# Patient Record
Sex: MALE | Race: WHITE
[De-identification: names, ages, dates, MRNs, and addresses within clinical notes are randomized per-mention and may not be internally consistent; named-entity substitution may affect disease eponyms.]

---

## 2020-03-17 ENCOUNTER — HOSPITAL ENCOUNTER (OUTPATIENT)
Dept: HOSPITAL 35 - SJCVC | Age: 60
End: 2020-03-17
Attending: INTERNAL MEDICINE
Payer: COMMERCIAL

## 2020-03-17 DIAGNOSIS — E78.00: ICD-10-CM

## 2020-03-17 DIAGNOSIS — R53.83: ICD-10-CM

## 2020-03-17 DIAGNOSIS — N18.6: ICD-10-CM

## 2020-03-17 DIAGNOSIS — I42.9: ICD-10-CM

## 2020-03-17 DIAGNOSIS — I65.23: ICD-10-CM

## 2020-03-17 DIAGNOSIS — I25.10: ICD-10-CM

## 2020-03-17 DIAGNOSIS — Z79.899: ICD-10-CM

## 2020-03-17 DIAGNOSIS — I13.2: ICD-10-CM

## 2020-03-17 DIAGNOSIS — I50.43: ICD-10-CM

## 2020-03-17 DIAGNOSIS — Z01.818: Primary | ICD-10-CM

## 2020-03-17 DIAGNOSIS — R94.31: ICD-10-CM

## 2020-03-17 DIAGNOSIS — I44.30: ICD-10-CM

## 2020-03-17 DIAGNOSIS — I08.8: ICD-10-CM

## 2020-03-17 DIAGNOSIS — F17.200: ICD-10-CM

## 2020-03-17 DIAGNOSIS — Z99.2: ICD-10-CM

## 2020-03-17 DIAGNOSIS — I48.92: ICD-10-CM

## 2020-03-24 ENCOUNTER — HOSPITAL ENCOUNTER (INPATIENT)
Dept: HOSPITAL 35 - CATH | Age: 60
LOS: 1 days | Discharge: LEFT BEFORE DISCHARGE | DRG: 286 | End: 2020-03-25
Attending: INTERNAL MEDICINE | Admitting: INTERNAL MEDICINE
Payer: COMMERCIAL

## 2020-03-24 VITALS — DIASTOLIC BLOOD PRESSURE: 77 MMHG | SYSTOLIC BLOOD PRESSURE: 130 MMHG

## 2020-03-24 VITALS — SYSTOLIC BLOOD PRESSURE: 113 MMHG | DIASTOLIC BLOOD PRESSURE: 86 MMHG

## 2020-03-24 VITALS — SYSTOLIC BLOOD PRESSURE: 128 MMHG | DIASTOLIC BLOOD PRESSURE: 92 MMHG

## 2020-03-24 VITALS — SYSTOLIC BLOOD PRESSURE: 115 MMHG | DIASTOLIC BLOOD PRESSURE: 78 MMHG

## 2020-03-24 VITALS — DIASTOLIC BLOOD PRESSURE: 89 MMHG | SYSTOLIC BLOOD PRESSURE: 130 MMHG

## 2020-03-24 VITALS — HEIGHT: 70.98 IN | WEIGHT: 223.31 LBS | BODY MASS INDEX: 31.26 KG/M2

## 2020-03-24 VITALS — DIASTOLIC BLOOD PRESSURE: 96 MMHG | SYSTOLIC BLOOD PRESSURE: 133 MMHG

## 2020-03-24 VITALS — DIASTOLIC BLOOD PRESSURE: 94 MMHG | SYSTOLIC BLOOD PRESSURE: 135 MMHG

## 2020-03-24 VITALS — SYSTOLIC BLOOD PRESSURE: 138 MMHG | DIASTOLIC BLOOD PRESSURE: 94 MMHG

## 2020-03-24 VITALS — DIASTOLIC BLOOD PRESSURE: 92 MMHG | SYSTOLIC BLOOD PRESSURE: 135 MMHG

## 2020-03-24 VITALS — DIASTOLIC BLOOD PRESSURE: 81 MMHG | SYSTOLIC BLOOD PRESSURE: 122 MMHG

## 2020-03-24 VITALS — DIASTOLIC BLOOD PRESSURE: 97 MMHG | SYSTOLIC BLOOD PRESSURE: 129 MMHG

## 2020-03-24 VITALS — SYSTOLIC BLOOD PRESSURE: 122 MMHG | DIASTOLIC BLOOD PRESSURE: 81 MMHG

## 2020-03-24 DIAGNOSIS — N18.6: ICD-10-CM

## 2020-03-24 DIAGNOSIS — I12.0: ICD-10-CM

## 2020-03-24 DIAGNOSIS — I48.91: ICD-10-CM

## 2020-03-24 DIAGNOSIS — Z53.29: ICD-10-CM

## 2020-03-24 DIAGNOSIS — E87.70: ICD-10-CM

## 2020-03-24 DIAGNOSIS — I25.10: Primary | ICD-10-CM

## 2020-03-24 DIAGNOSIS — J44.9: ICD-10-CM

## 2020-03-24 DIAGNOSIS — I27.20: ICD-10-CM

## 2020-03-24 DIAGNOSIS — I42.8: ICD-10-CM

## 2020-03-24 DIAGNOSIS — Z79.82: ICD-10-CM

## 2020-03-24 DIAGNOSIS — Z79.899: ICD-10-CM

## 2020-03-24 DIAGNOSIS — M19.90: ICD-10-CM

## 2020-03-24 LAB
ANION GAP SERPL CALC-SCNC: 7 MMOL/L (ref 7–16)
BUN SERPL-MCNC: 46 MG/DL (ref 7–18)
CALCIUM SERPL-MCNC: 9.9 MG/DL (ref 8.5–10.1)
CHLORIDE SERPL-SCNC: 98 MMOL/L (ref 98–107)
CO2 SERPL-SCNC: 31 MMOL/L (ref 21–32)
CREAT SERPL-MCNC: 7.2 MG/DL (ref 0.7–1.3)
ERYTHROCYTE [DISTWIDTH] IN BLOOD BY AUTOMATED COUNT: 16.3 % (ref 10.5–14.5)
GLUCOSE SERPL-MCNC: 71 MG/DL (ref 74–106)
HCT VFR BLD CALC: 35.7 % (ref 42–52)
HGB BLD-MCNC: 11.8 GM/DL (ref 14–18)
MCH RBC QN AUTO: 33.3 PG (ref 26–34)
MCHC RBC AUTO-ENTMCNC: 32.9 G/DL (ref 28–37)
MCV RBC: 101.1 FL (ref 80–100)
PLATELET # BLD: 244 THOU/UL (ref 150–400)
POTASSIUM SERPL-SCNC: 4.7 MMOL/L (ref 3.5–5.1)
RBC # BLD AUTO: 3.53 MIL/UL (ref 4.5–6)
SODIUM SERPL-SCNC: 136 MMOL/L (ref 136–145)
WBC # BLD AUTO: 6.5 THOU/UL (ref 4–11)

## 2020-03-24 PROCEDURE — 4A023N8 MEASUREMENT OF CARDIAC SAMPLING AND PRESSURE, BILATERAL, PERCUTANEOUS APPROACH: ICD-10-PCS | Performed by: INTERNAL MEDICINE

## 2020-03-24 PROCEDURE — 32100 EXPLORATION OF CHEST: CPT

## 2020-03-24 PROCEDURE — B2111ZZ FLUOROSCOPY OF MULTIPLE CORONARY ARTERIES USING LOW OSMOLAR CONTRAST: ICD-10-PCS | Performed by: INTERNAL MEDICINE

## 2020-03-24 PROCEDURE — B2151ZZ FLUOROSCOPY OF LEFT HEART USING LOW OSMOLAR CONTRAST: ICD-10-PCS | Performed by: INTERNAL MEDICINE

## 2020-03-24 NOTE — HC
[facility]
[address]
[place]   [address]                     CONSULTATION                  
_______________________________________________________________________________
 
Name:       CYRUS RAGSDALE SOO                Room #:         213-P       REG CLI 
M.R.#:      [id number]                       Account #:      [id number]  
Admission:  03/24/20    Attend Phys:    Buddy Sanches MD,
Discharge:              Date of Birth:  [birth year]  
                                                          Report #: [id number]
                                                                    [id number]   
_______________________________________________________________________________
THIS REPORT FOR:  
 
cc:  Laura Mari Karen K. RNP Al-Absi, Ahmed I. MD                                          ~
CC: Buddy Mari
 
DATE OF SERVICE:  03/25/2020
 
 
REASON FOR THE CONSULTATION:  End-stage renal disease.
 
REASON FOR THE PRESENTATION:  Post-cardiac cath.
 
HISTORY OF PRESENT ILLNESS:  A 60-year-old with past medical history of
end-stage renal disease, maintained on hemodialysis.  He was in the cardiac cath
yesterday having a left- and right-sided heart catheterization for progressive
dyspnea and severe left ventricular dysfunction that was found on the echo. 
Apparently, the patient has been on dialysis for the last 18 months.  He
attributed his end-stage renal disease due to uncontrolled hypertension.  He
dialyzes every Monday, Wednesday, Friday.  He was found to have significant
pulmonary artery pressure.  He was admitted for further evaluation and
aggressive dialysis regimen.  I was asked to evaluate his needs for daily
dialysis needs to attain better volume control.
 
MEDICATIONS:
1.  Lisinopril.
2.  Flomax.
3.  Eliquis.
4.  Metoprolol.
 
PAST MEDICAL HISTORY:
1.  Severe hypertension, uncontrolled.
2.  Cardiomyopathy.
3.  COPD.
4.  Degenerative joint disease.
5.  Post-hernia repair.
6.  Remote history of heavy tobacco abuse.
 
SOCIAL HISTORY:  .  Lives with his father.
 
ALLERGIES:  None.
 
REVIEW OF SYSTEMS:
GENERAL:  No fever or chills.
 
 
 
[facility]
[address]
[place]   [address]                     CONSULTATION                  
_______________________________________________________________________________
 
Name:       CYRUS RAGSDALE                Room #:         213-P       REG CLI 
CORINA#:      [id number]                       Account #:      [id number]  
Admission:  03/24/20    Attend Phys:    Buddy Sanches MD,
Discharge:              Date of Birth:  [birth year]  
                                                          Report #: [id number]
                                                                    [id number]   
_______________________________________________________________________________
CARDIOVASCULAR:  No chest pain or palpitation.  Significant for dyspnea on
exertion.
PULMONARY:  Significant for dyspnea on exertion.
GASTROINTESTINAL:  No nausea or vomiting.
GENITOURINARY:  Minimal urine.  No frequency, no urgency.
MUSCULOSKELETAL:  Occasional back pain.
 
LABORATORY VALUES:  Reviewed.  Hemoglobin is 11.4.  Sodium is 136, BUN is 46,
creatinine 7.2.
 
ASSESSMENT, IMPRESSION, PLAN:
1.  End-stage renal disease.
2.  Cardiomyopathy.
3.  Hypertension.
4.  Severe pulmonary hypertension.
5.  We will work on daily dialysis and ultrafiltration.
6.  We will discuss with the cardiac team regarding his pulmonary hypertension.
7.  Resume his usual cardiomyopathy medications.
8.  We will continue to follow.
 
 
 
 
 
 
 
 
 
 
 
 
 
 
 
 
 
 
 
 
 
 
 
 
 
                         
   By:                               
                   
D: 03/25/20 0747                           _____________________________________
T: 03/25/20 0815                           Miriam Barone MD            /nt

## 2020-03-24 NOTE — NUR
PT ADMITED FROM CATH LAB. ADMISSION HX AND ASSESSMENT COMPLETED. VSS. RIGHT
GROIN INCISION C/D/I. NO HAMATOMA NOTED. POST CARDIAC CATH INSTRUCTIONS GIVEN
TO PT. PT VERBERLISED UNDERSTANDING. WILL CONTINUE TO MONITOR.

## 2020-03-24 NOTE — H
[facility]
[address]
[place]   [address]                     HISTORY AND PHYSICAL          
_______________________________________________________________________________
 
Name:       CYRUS RAGSDALE                Room #:                     REG [facility]#:      [id number]                       Account #:      [id number]  
Admission:  03/24/20    Attend Phys:    Buddy Sanches MD,
Discharge:              Date of Birth:  [birth year]  
                                                          Report #: [id number]
                                                                    [id number]   
_______________________________________________________________________________
THIS REPORT FOR:  
 
cc:  Laura Mari Karen K. RNP                                                   
                                                                  ~
CC: Buddy Mari
 
DATE OF SERVICE:  03/24/2020
 
 
HISTORY OF PRESENT ILLNESS:  The patient is a 60-year-old male who I had brought
in for right and left heart catheterization with some progressive dyspnea,
shortness of breath and severe LV dysfunction in the office last week on an
echo.  He has been on end-stage kidney disease with hemodialysis for the last 18
months or so, but became progressively more short of breath and dyspneic.  Right
and left heart catheterization was performed, which revealed severe pulmonary
hypertension and wedge pressure in the 30-32, PA pressure of 70s over 30s with
diminished cardiac output, cardiac index.  We placed him on admission for more
aggressive diuresis and titration of medications.  He is considering renal
transplant, although at this state, he would not be a candidate.  His coronary
disease was mild in 3-vessel, but this LV dysfunction is relatively new since he
was lost to follow up for me since 2015 until last week.  I was trying to
titrate up his beta blocker.  He did not tolerate at all.  So, he has held that
for 2 days.  He has been on Lasix.  He makes some urine.  Dialysis is Monday,
Wednesday and Friday.  Lisinopril 40, Flomax, Eliquis 2.5 q. 12 hours,
metoprolol 100, which he held, we try to increase him to 100, he did not
tolerate this.
 
PAST MEDICAL HISTORY:  Positive for this, now idiopathic cardiomyopathy with a
significant cardiorenal issue here with mild coronary disease, longstanding
hypertension, hernia repair, vein surgery, DJD, COPD, prior heavy tobacco and
alcohol.
 
SOCIAL HISTORY:  He is , has children.  He lives with his father.  He
has disability.  He is a prior heavy tobacco user, current little to a few
drinks a week and a few cigarettes a day now, he states.  Some caffeine.
 
ALLERGIES:  No known drug allergies.
 
FAMILY HISTORY:  Did not know his parents' medical history.
 
REVIEW OF SYSTEMS:  Negative except for stated above, progressive fatigue,
shortness of breath and dyspnea.  Marked increase in fatigue.  No syncope.
 
PHYSICAL EXAMINATION:
 
 
 
[facility]
[address]
[place]   [address]                     HISTORY AND PHYSICAL          
_______________________________________________________________________________
 
Name:       CYRUS RAGSDALE                Room #:                     REG [facility]#:      [id number]                       Account #:      [id number]  
Admission:  03/24/20    Attend Phys:    Buddy Sanches MD,
Discharge:              Date of Birth:  [birth year]  
                                                          Report #: [id number]
                                                                    [id number]   
_______________________________________________________________________________
GENERAL:  He is not in acute distress.  He is mildly dyspneic.
VITAL SIGNS:  Blood pressure is 120s/80s-90s pulse is .
HEENT:  Eyes reveal xanthelasmas.  Pharynx is clear.
NECK:  Shows some evidence of JVD at approximately 4 cm.
LUNGS:  Clear anteriorly, diminished in the bases.  Crackles are noted
bilaterally.
CARDIOVASCULAR:  S1 and S2.  There is a faint S3 summation gallop, faint
holosystolic murmur at the apex.
ABDOMEN:  Soft, slightly distended.
EXTREMITIES:  Reveal trace of edemas.  Pulses diminished, but intact.
NEUROLOGIC:  Nonfocal.
SKIN:  Warm and dry without xanthoma or ulcer.
MUSCULOSKELETAL:  Generalized arthritic changes.
 
ASSESSMENT:
1.  Severe apparent idiopathic cardiomyopathy, possibly hypertensive induced.
2.  End-stage renal disease, on hemodialysis with marked volume overload.
3.  Mild coronary artery disease.
4.  History of longstanding hypertension.
5.  Chronic obstructive pulmonary disease with prior heavy tobacco use.
6.  Prior alcohol abuse.
 
RECOMMENDATIONS AND PLAN:  The patient was having worked up for renal
transplant.  Certainly we have got significant issues prior to consideration of
that.  We will ask Nephrology consult and to try to remove some of this fluid
and need to titrate up medications as tolerated here.  We have some blood
pressure to play with here.  His cardiac index is slightly over 2.  Certainly a
significant issue here and we will try to make some strides here with
medications and volume overload.
 
 
 
 
 
 
 
 
 
 
 
 
 
 
 
                         
   By:                               
                   
D: 03/24/20 1407                           _____________________________________
T: 03/24/20 1423                                                           /nt

## 2020-03-24 NOTE — CATHLAB
[facility]
[address] [employer]
[place]   [address]                   INVASIVE PROCEDURE REPORT     
_______________________________________________________________________________
 
Name:       JNCYRUS                Room #:         213-P       REG AMINA [facility]#:      [id number]                       Account #:      [id number]  
Admission:  03/24/20    Attend Phys:    Buddy Sanches MD,
Discharge:              Date of Birth:  [birth year]  
                                                          Report #: [id number]
                                                                    [id number]
_______________________________________________________________________________
THIS REPORT FOR:  
 
cc:  Laura Mari Karen K. RNP Mancuso, Gerald M. MD [facility]                                        
                                                                       ~
 
--------------- APPROVED REPORT --------------
 
 
Study performed:  03/24/2020 12:42:25
 
Patient Details
Patient Status: Out-Patient                  Room #: 
The patient is a 60 year-old male
 
Event Personnel
Buddy Sanches  Interventional Cardiologist, Westley Nugent  RN, Bonnie Irvin RTR, Leon Thompson Roberta  Monitor
 
Procedures Performed
Art Access - R femoral artery*  Horacio Access - R femoral vein  
Hemostasis w/ Mynx  Hemostasis with Manual pressure  Right and Left 
Heart Cath w/or w/o Coronarie [id number] [facility] Renal Bilateral Peripheral 
Angiography [id number] CVRENALBIL 33467 Initial Mod Sed Same Phys/QHP 
Gr5y [id number] 44525 Mod Sed Same Phys/QHP Ea 914571
 
Indication
Chest pain
 
Procedure Narrative
The Right Groin^ was infiltrated with 1% Lidocaine subcutaneous 
anesthesia. A PINNACLE 6FR Sheath #[id number] sheath was inserted into 
the RFA 6F^. Coronary angiography was performed using coronary 
diagnostic catheters. The right coronary system was accessed and 
visualized with a JR4 catheter. The left coronary system was accessed 
and visualized with a JL5 catheter. The left ventricle was accessed 
and visualized with a STR PIG catheter. Left ventriculogram was 
performed in 30 degree projection. Hemostasis was obtained with 
manual pressure following sheath removal without any complications. 
The patient tolerated the procedure well and there were no 
complications associated with the procedure. There was no 
hematoma.
 
Intraoperative Conscious Sedation
Sedation start time:  1318           Case end Time:  
 
 
[facility]
[employer]
[place]  [address]
Phone:  [phone]                    INVASIVE PROCEDURE REPORT     
_______________________________________________________________________________
 
Name:            CYRUS RAGSDALE                Room #:        213-P       [facility]#:           [id number]          Account #:     [id number]  
Admission:       03/24/20         Attend Phys:   Buddy Sanches,
Discharge:                  Date of Birth: [birth year]  
                         Report #:      [id number]
        [id number]
_______________________________________________________________________________
1420    
Fentanyl  100 mcg    Versed  1.5 mg  
 
Fluoro Time:    1911.00 minutes     
Dose:     DAP 48872.30 cGycm2  1011 mGy  
Contrast Type and Amount:  Omnipaque 135 ml    
 
Hemodynamics
The right atrial mean pressure is 29/24/26 mmHg. The right 
ventricular pressure is 61/37 mmHg. The pulmonary artery pressure is 
69/42 mmHg with a mean of 52 mmHg. The mean pulmonary capillary wedge 
pressure is 29 mmHg. The aortic pressure is 131/89 mmHg with a mean 
of 103 mmHg. The left ventricular pressure is 124/18 mmHg with a mean 
of mmHg. The cardiac output using thermo method is 6.10 L/min. The 
cardiac index using thermo method is 2.84 L/min/m2.
 
Conclusion
#1.  Successful right heart catheterization with market elevation in 
pulmonary pressures and pulmonary capillary wedge pressure see above 
hemodynamics.
#2 left ventriculogram significantly dilated left ventricle with 
severe global hypokinesis EF 20% range with 1-2+ mitral 
regurgitation.
#3 large left main with no significant occlusive disease giving rise 
to LAD and circumflex
#4 LAD extends around the apex with mild irregularities no occlusive 
disease
#5 circumflex OM nondominant with mild disease.
#6 dominant right coronary artery mild Miguel disease.  No occlusive 
disease
#7 left renal artery has an eccentric proximal lesion of 70% with 
severe distal attenuated disease
#8 the right renal artery also has mild irregularity but is of severe 
diffusely attenuated disease
 
Recommendations and plan: Patient is significantly volume overloaded 
severe suspected hypertensive and/or alcohol idiopathic 
cardiomyopathy.  Market volume overload.  Patient will be admitted to 
the hospital for attempted diuresis
Nephrology consult patient is currently hemodialysis patient however 
is making some urine.  Dialysis to remove significant volume in 
 
 
[address]  [address]
Phone:  [phone]                    INVASIVE PROCEDURE REPORT     
_______________________________________________________________________________
 
Name:            CYRUS RAGSDALE                Room #:        213-P       [facility]#:           [id number]          Account #:     [id number]  
Admission:       03/24/20         Attend Phys:   Buddy Sanches,
Discharge:                  Date of Birth: [birth year]  
                         Report #:      [id number]
        [id number]
_______________________________________________________________________________
addition.  Titration of medications.  He is hemodynamically stable 
upon transfer to the CCU.
 
 
 
 
 
 
 
 
 
 
 
 
 
 
 
 
 
 
 
 
 
 
 
 
 
 
 
 
 
 
 
 
 
 
 
 
 
 
 
 
 
 
  <ELECTRONICALLY SIGNED>
   By: Buddy Sanches MD, FACC   
  03/24/20 1637
D: 03/24/20 1637                           _____________________________________
T: 03/24/20 1637                           Buddy Sanches MD, FACC     /INF

## 2020-03-24 NOTE — EKG
[facility]
[address]
[place]   [address]                     ELECTROCARDIOGRAM REPORT      
_______________________________________________________________________________
 
Name:       CYRUS RAGSDALE                Room #:                     REG CLI 
M.R.#:      [id number]                       Account #:      [id number]  
Admission:  20    Attend Phys:    Buddy Sanches MD,
Discharge:              Date of Birth:  [birth year]  
                                                          Report #: [id number]
                                                                    [id number]
_______________________________________________________________________________
THIS REPORT FOR:  
 
cc:  Laura Mari Karen K. RNP Couchonnal, Luis F. MD                                            ~
THIS REPORT FOR:   //name//                          
 
                          [facility]
                                       
Test Date:    2020               Test Time:    12:41:36
Pat Name:     CYRUS RAGSDALE             Department:   
Patient ID:   [id number]            Room:          
Gender:                               Technician:   JENNY MALONE
:          1960               Requested By: Buddy Sanches
Order Number: [id number] MD:   Efrain Connelly
                                 Measurements
Intervals                              Axis          
Rate:         98                       P:            57
CT:           143                      QRS:          -55
QRSD:         144                      T:            104
QT:           377                                    
QTc:          482                                    
                           Interpretive Statements
Sinus rhythm
Left bundle branch block
No previous ECG available for comparison
 
Electronically Signed On 3- 13:21:24 CDT by Efrain Connelly
[email or web address]
 
 
 
 
 
 
 
 
 
 
 
 
 
 
 
  <ELECTRONICALLY SIGNED>
   By: Efrain Connelly MD        
  20     1321
D: 20 1241                           _____________________________________
T: 20 1241                           Efrain Connelly MD          /EPI

## 2020-03-25 VITALS — SYSTOLIC BLOOD PRESSURE: 143 MMHG | DIASTOLIC BLOOD PRESSURE: 87 MMHG

## 2020-03-25 VITALS — DIASTOLIC BLOOD PRESSURE: 82 MMHG | SYSTOLIC BLOOD PRESSURE: 123 MMHG

## 2020-03-25 VITALS — SYSTOLIC BLOOD PRESSURE: 124 MMHG | DIASTOLIC BLOOD PRESSURE: 86 MMHG

## 2020-03-25 VITALS — DIASTOLIC BLOOD PRESSURE: 88 MMHG | SYSTOLIC BLOOD PRESSURE: 124 MMHG

## 2020-03-25 VITALS — DIASTOLIC BLOOD PRESSURE: 84 MMHG | SYSTOLIC BLOOD PRESSURE: 111 MMHG

## 2020-03-25 VITALS — SYSTOLIC BLOOD PRESSURE: 133 MMHG | DIASTOLIC BLOOD PRESSURE: 87 MMHG

## 2020-03-25 VITALS — DIASTOLIC BLOOD PRESSURE: 84 MMHG | SYSTOLIC BLOOD PRESSURE: 131 MMHG

## 2020-03-25 VITALS — SYSTOLIC BLOOD PRESSURE: 139 MMHG | DIASTOLIC BLOOD PRESSURE: 99 MMHG

## 2020-03-25 VITALS — DIASTOLIC BLOOD PRESSURE: 92 MMHG | SYSTOLIC BLOOD PRESSURE: 130 MMHG

## 2020-03-25 VITALS — DIASTOLIC BLOOD PRESSURE: 92 MMHG | SYSTOLIC BLOOD PRESSURE: 121 MMHG

## 2020-03-25 VITALS — SYSTOLIC BLOOD PRESSURE: 134 MMHG | DIASTOLIC BLOOD PRESSURE: 86 MMHG

## 2020-03-25 VITALS — DIASTOLIC BLOOD PRESSURE: 88 MMHG | SYSTOLIC BLOOD PRESSURE: 126 MMHG

## 2020-03-25 VITALS — SYSTOLIC BLOOD PRESSURE: 143 MMHG | DIASTOLIC BLOOD PRESSURE: 95 MMHG

## 2020-03-25 VITALS — SYSTOLIC BLOOD PRESSURE: 166 MMHG | DIASTOLIC BLOOD PRESSURE: 62 MMHG

## 2020-03-25 LAB
ALBUMIN SERPL-MCNC: 3.7 G/DL (ref 3.4–5)
ANION GAP SERPL CALC-SCNC: 11 MMOL/L (ref 7–16)
BUN SERPL-MCNC: 58 MG/DL (ref 7–18)
CALCIUM SERPL-MCNC: 9.6 MG/DL (ref 8.5–10.1)
CHLORIDE SERPL-SCNC: 97 MMOL/L (ref 98–107)
CO2 SERPL-SCNC: 28 MMOL/L (ref 21–32)
CREAT SERPL-MCNC: 9 MG/DL (ref 0.7–1.3)
ERYTHROCYTE [DISTWIDTH] IN BLOOD BY AUTOMATED COUNT: 16.6 % (ref 10.5–14.5)
GLUCOSE SERPL-MCNC: 97 MG/DL (ref 74–106)
HCT VFR BLD CALC: 32.2 % (ref 42–52)
HCT VFR BLD CALC: 34.7 % (ref 42–52)
HGB BLD-MCNC: 10.7 GM/DL (ref 14–18)
HGB BLD-MCNC: 11.4 GM/DL (ref 14–18)
MCH RBC QN AUTO: 33.3 PG (ref 26–34)
MCHC RBC AUTO-ENTMCNC: 32.9 G/DL (ref 28–37)
MCV RBC: 101.1 FL (ref 80–100)
PHOSPHATE SERPL-MCNC: 7.4 MG/DL (ref 2.5–4.9)
PLATELET # BLD: 222 THOU/UL (ref 150–400)
POTASSIUM SERPL-SCNC: 5.8 MMOL/L (ref 3.5–5.1)
RBC # BLD AUTO: 3.43 MIL/UL (ref 4.5–6)
SODIUM SERPL-SCNC: 136 MMOL/L (ref 136–145)
WBC # BLD AUTO: 7.8 THOU/UL (ref 4–11)

## 2020-03-25 PROCEDURE — 5A1D70Z PERFORMANCE OF URINARY FILTRATION, INTERMITTENT, LESS THAN 6 HOURS PER DAY: ICD-10-PCS | Performed by: INTERNAL MEDICINE

## 2020-03-25 NOTE — EKG
[facility]
[address]
[place]   [address]                     ELECTROCARDIOGRAM REPORT      
_______________________________________________________________________________
 
Name:       CYRUS RAGSDALE SOO                Room #:         213-[facility] 
M..#:      [id number]                       Account #:      [id number]  
Admission:  20    Attend Phys:    Buddy Sanches MD,
Discharge:              Date of Birth:  [birth year]  
                                                          Report #: [id number]
                                                                    [id number]
_______________________________________________________________________________
THIS REPORT FOR:  
 
cc:  Laura Mari Karen K. RNP Lundgren, Craig H. MD [facility]                                        ~
THIS REPORT FOR:   //name//                          
 
                          [facility]
                                       
Test Date:    2020               Test Time:    07:16:33
Pat Name:     CYRUS RAGSDALE             Department:   
Patient ID:   [id number]            Room:         213 
Gender:       M                        Technician:   JANNETTE
:          1960               Requested By: Buddy Sanches
Order Number: [id number] MD:   Kendall Pringle
                                 Measurements
Intervals                              Axis          
Rate:         98                       P:            
AZ:                                    QRS:          -54
QRSD:         150                      T:            111
QT:           387                                    
QTc:          495                                    
                           Interpretive Statements
Atrial fibrillation
Left bundle branch block
Compared to ECG 2020 12:41:36
Atrial fibrillation has replaced atrial flutter
Electronically Signed On 3- 9:50:48 CDT by Kendall Pringle
[email or web address]
 
 
 
 
 
 
 
 
 
 
 
 
 
 
 
  <ELECTRONICALLY SIGNED>
   By: Kendall Pringle MD, [facility]   
  20     0950
D: 20 0716                           _____________________________________
T: 20 0716                           Kendall Pringle MD, [facility]     /EPI

## 2020-03-25 NOTE — NUR
Pt A&Ox4. VSS afebrile. SR with BBB on monitor. STBBB on monitor presently. Pt
c/o R groin pain while getting vs at 2345. Hematoma noted. Pressure applied.
Charge ns and preceptor notified and assisted with holding pressure. Dr Sanches notified of hematoma. Pt on strict bedrest. Am eliquis will be held.
Stat hg done = 10. Morphine given x2 so far for pain 6/10 to right groin
site.Instructed pt on strict bedrest and , to keep right leg straight. Right
leg has good pulses and is warm to touch, O2 sats WNl 94- 100% on RA.

## 2020-03-25 NOTE — NUR
PT WOKE UP AFTER HD AGITATED AND WANTING TO LEAVE AMA. DR CLARKE WAS NOTIFIED
AND CAME TO THE BEDSIDE TO TALK TO PT ABOUT POC AND CLINICAL STATE. PT
REMAINED ADAMANT TO LEAVE. IV WAS REMOVED BUT PT REFUSED TO SIGN AMA
PAPERWORK. PT REPORTED THAT HE WAS NOT UPSET BUT HE FELT GOOD ENOUGH TO DRIVE
HOME. WHEN ASKED WHY HE COULDN'T SIGN THE AMA FORM IF HE WASN'T UPSET HE
REPORTED THAT "THE FORM IS A LIE." SECURITY WAS PRESENT INTIIALLY AND HOUSE
SUPERVISOR NOTIFIED. VINCE AWARE. PT LEAVING UNIT AT THIS TIME.

## 2020-03-25 NOTE — NUR
Pt had no further bleeding noted at right groin site. Hematoma has not
increased. Pt insructed on bedrest and to  keep leg straight. Day shift ns was
notified to hold am eliquis. Hr slightly tachy  but other VSS. No c/o Cp
tonight but c/o SOA with head flat. O2 applied per preceptor. Good femoral and
pedal pulses noted. Legs and feet are warm to touch. pain decreased with
morphine given as ordered. Pt slept after second dose of morphine and benadryl
for itching. Notified day shift ns that phosphate binder is needed to be
ordered today when dr makes rounds per pt request.

## 2020-03-25 NOTE — NUR
PT CARE ASSUMED APPROX 0700. ASSESSMENTS AS CHARTED. DENIES PAIN AND SOA. VSS.
PT COMPLETED HD THIS SHIFT WITHOUT ISSUE. UP WITH STEADY GAIT. RIGHT GROIN
POST CATH SITE C/D/I WITHOUT HEMATOMA. PT TOLERATING POC. DENIES QUESTIONS OR
CONCERNS REGARDING POC. NO DISTRESS NOTED.